# Patient Record
(demographics unavailable — no encounter records)

---

## 2024-07-16 RX ORDER — OXYCODONE HYDROCHLORIDE 5 MG/1
5-10 TABLET ORAL EVERY 4 HOURS PRN
Qty: 30 TABLET | Refills: 0 | OUTPATIENT
Start: 2024-07-16

## 2024-07-16 NOTE — TELEPHONE ENCOUNTER
[facility] pharmacy [facility] sent Rx request for the following:      Requested Prescriptions   Pending Prescriptions Disp Refills    oxyCODONE (ROXICODONE) 5 MG tablet [Pharmacy Med Name: OXYCODONE HYDROCHLORIDE 5MG TABLET] 30 tablet 0     Sig: TAKE 1-2 TABLETS (5-10 MG) BY MOUTH EVERY 4 HOURS AS NEEDED FOR PAIN       There is no refill protocol information for this order          Last Prescription Date:   9/28/2015  Last Fill Qty/Refills:         30, R-0    Last Office Visit:              NEVER   Future Office visit:            NONE    PATIENT HAS NEVER BEEN SEEN AT [facility].  Hien Carter RN on 7/16/2024 at 2:03 PM